# Patient Record
Sex: FEMALE | Race: BLACK OR AFRICAN AMERICAN | NOT HISPANIC OR LATINO | URBAN - METROPOLITAN AREA
[De-identification: names, ages, dates, MRNs, and addresses within clinical notes are randomized per-mention and may not be internally consistent; named-entity substitution may affect disease eponyms.]

---

## 2018-05-26 ENCOUNTER — EMERGENCY (EMERGENCY)
Facility: HOSPITAL | Age: 43
LOS: 1 days | Discharge: HOME | End: 2018-05-26
Attending: EMERGENCY MEDICINE | Admitting: EMERGENCY MEDICINE
Payer: SELF-PAY

## 2018-05-26 VITALS
RESPIRATION RATE: 17 BRPM | HEART RATE: 75 BPM | DIASTOLIC BLOOD PRESSURE: 84 MMHG | TEMPERATURE: 98 F | OXYGEN SATURATION: 100 % | SYSTOLIC BLOOD PRESSURE: 144 MMHG

## 2018-05-26 VITALS
SYSTOLIC BLOOD PRESSURE: 152 MMHG | HEART RATE: 95 BPM | OXYGEN SATURATION: 98 % | TEMPERATURE: 99 F | DIASTOLIC BLOOD PRESSURE: 94 MMHG | RESPIRATION RATE: 16 BRPM

## 2018-05-26 PROCEDURE — 70486 CT MAXILLOFACIAL W/O DYE: CPT | Mod: 26

## 2018-05-26 PROCEDURE — 99284 EMERGENCY DEPT VISIT MOD MDM: CPT

## 2018-05-26 PROCEDURE — 73030 X-RAY EXAM OF SHOULDER: CPT | Mod: 26,RT

## 2018-05-26 PROCEDURE — 70450 CT HEAD/BRAIN W/O DYE: CPT | Mod: 26

## 2018-05-26 RX ORDER — ACETAMINOPHEN 500 MG
650 TABLET ORAL ONCE
Qty: 0 | Refills: 0 | Status: COMPLETED | OUTPATIENT
Start: 2018-05-26 | End: 2018-05-26

## 2018-05-26 RX ADMIN — Medication 650 MILLIGRAM(S): at 14:21

## 2018-05-26 NOTE — ED PROVIDER NOTE - ATTENDING CONTRIBUTION TO CARE
NEW EASON MD: Reviewed and agree with the HPI as documented below:   43f c/o head and facial injury after large cabinet fell onto her head while she was reaching for something on a high shelf. No LOC, not on a/c. Felt dizzy immediately after, also w blurry vision in right eye, b/l nosebleed that has decreased since arrival. No vomiting, mild headache. Also c/o r shoulder pain and LLE pain from trauma. Has been ambulating w/o difficulty. Has not taken anything for pain.  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 Requests only tylenol for pain.    NEW AVILA, ATTENDING NOTE:      GEN: Patient is awake and alert and in no acute distress.    HEENT: 4 cm hematoma to right forehead with periorbital ecchymosis.  Airway patent.  No oropharyngeal edema.  No stridor.  Auricles are normal.  Neck supple.  EOMI.  Nasal bone tenderness to palpation with dries blood in nares without evidence of septal hematoma.  RESP: Lungs CTAB, no wheeze, no rhonchi,  no rales.    CV: Heart is regular rate and rhythm.    ABD: Abdomen is soft, not distended +BS.  Non-tender to palpation.  No rebound/no guarding.  MSK: Back is nontender, no CVAT.  Moving all 4 extremities.     NEURO: Neurologically grossly intact.     PSYCH: Psychiatrically normal mood and affect.  No apparent risk to self or others.     DR. AVILA, ATTENDING MD:    I performed a face to face bedside interview with patient regarding history of present illness, review of symptoms and past medical history. I completed an independent physical exam.  I have discussed patient's plan of care with the team of health care providers.   I agree with note as stated above, having amended the EMR as needed to reflect my findings. I have discussed the assessment and plan of care.  This includes during the time I functioned as the attending physician for this patient.

## 2018-05-26 NOTE — ED ADULT TRIAGE NOTE - CHIEF COMPLAINT QUOTE
Pt. hit in face this morning by cabinet that fell off wall. Laceration noted to nose, swelling to right orbit and bruise to forehead. c/o blurry vision of right eye and pain to nose. No active bleeding at this time. Denies blood thinner use.

## 2018-05-26 NOTE — CONSULT NOTE ADULT - SUBJECTIVE AND OBJECTIVE BOX
· HPI Objective Statement:  43f c/o head and facial injury after large cabinet fell onto her head while she was reaching for something on a high shelf. No LOC, not on a/c. Felt dizzy immediately after, also w blurry vision in right eye, b/l nosebleed that has resolved since arrival. No vomiting, mild headache. denies sob, voice changes, drooling, choking/gasping with po. currently denies vision changes, hl, vertigo, otorrhea, rhinorrhea. 	       HIV Status:  · Offered: Declined	    :    Past Medical History:  No pertinent past medical history.     Tobacco Usage:  · Tobacco Usage	Never smoker	      Allergies:        Allergies:  	No Known Allergies:     Home Medications:   * Outpatient Medication Status not yet specified       Review of Systems:  · CONSTITUTIONAL: no fever and no chills.	  · EYES: - - -	  · Eyes [+]: VISUAL CHANGES	  · Eyes [-]: no diplopia	  · ENMT: - - -	  · Ears [-]: no tinnitus	  · Nose [+]: EPISTAXIS, SINUS PRESSURE	  · Mouth/Throat [-]: no difficulty in swallowing	  · CARDIOVASCULAR: no chest pain and no edema.	  · RESPIRATORY: no chest pain, no cough, and no shortness of breath.	  · GASTROINTESTINAL: no abdominal pain, no bloating, no constipation, no diarrhea, no nausea and no vomiting.	  · MUSCULOSKELETAL: - - -	  · Musculoskeletal [+]: JOINT PAIN, r shoulder, l leg pain	  · Musculoskeletal [-]: no back pain, no neck pain	  · SKIN: - - -	  · Skin [+]: ABRASION, BRUISING, LACERATION	  · Skin [-]: no rash	  · NEUROLOGICAL: - - -	  · Neurological [+]: HEADACHE	  · Neurological [-]: no difficulty walking/imbalance	  · ROS STATEMENT: all other ROS negative except as per HPI	    VITAL SIGNS( Pullset):    ,,ED ADULT Flow Sheet:    26-May-2018 10:47	  · Temp (F): 99.1	  · Temp (C) Temp (C): 37.3	  · Temp site Temp Site: oral	  · Heart Rate Heart Rate (beats/min): 95	  · BP Systolic Systolic: 152	  · BP Diastolic Diastolic (mm Hg): 94	  · Respiration Rate (breaths/min) Respiration Rate (breaths/min): 16	  · SpO2 (%) SpO2 (%): 98	  · O2 delivery Patient On: room air	  · Presence of Pain: complains of pain/discomfort	  · Pain Rating (0-10): Rest: 7	  · Pain Rating (0-10): Activity: 7	  · SpO2 (%) SpO2 (%): 98	  · O2 delivery Patient On: room air	  · Preferred Language to Address Healthcare Preferred Language to Address Healthcare: English	    PHYSICAL EXAM:   NAD  breathing comfortably on ra  voice wnl  face: dorsal edema, no step-offs, non-mobile, no depression, right tear trough with sub-cm superficial abrasion  OD: infraorbital edema and ecchymosis, soft. eomi, perrl  OS: eomi, perrl  NC: septal deviation to the left, no septal hematoma, no active bleeding  oc/op: posterior op without bloodstaining or active bleeding  neck soft, flat, no crepitus      CT face/head:  Head CT: No intracranial hemorrhage, mass effect or displaced calvarial   fracture.     Maxillofacial CT: Bony nasal septal fracture and bilateral slightly   depressed nasal bone fractures with resultant moderate nasal vestibule   stenosis. Correlation with direct visualization is recommended to exclude   the possibility of a septal hematoma. Mild right periorbital swelling   without maxillofacial bone fractures.

## 2018-05-26 NOTE — ED PROVIDER NOTE - PROGRESS NOTE DETAILS
Elizabeth Goldberger PGY-1: seen by ENT for septal fx. Team in agreement that no evidence of septal hematoma, no acute surg intervention. Elizabeth Goldberger PGY-1: seen by ENT for septal fx. Team in agreement that no evidence of septal hematoma, no acute surg intervention. Pt feeling better. Will apply bacitracin to affected abrasion and dc w ent f/u

## 2018-05-26 NOTE — CONSULT NOTE ADULT - ASSESSMENT
43F w/ minimal depressed nasal fracture  -no acute intervention  -bacitracin over right tear trough abrasion after it is cleaned   -motrin/tylenol for pain prn  -ice packs to nose/right eye  - outpt [id number]  -avoid contact sports x 8 weeks

## 2018-05-26 NOTE — ED PROVIDER NOTE - SKIN LATERALITY #1
laceration just right of nasal bridge on right w scant active bleeding; also 0.5cm round lac on glabella/right right/abrasion just right of nasal bridge on right w scant active bleeding; also 0.5cm round area dried blood on middle of nasal bridge

## 2018-05-26 NOTE — ED PROVIDER NOTE - PLAN OF CARE
You were seen in the emergency department for facial trauma. You had a CT scan that showed nasal bone fractures. Please follow up with an ENT (contact list provided) in the next 5-7 days. Take motrin and tylenol as needed for pain. Return to the emergency department immediately if you experience visual changes, increased nasal bleeding, or any other concerning symptoms. You were seen in the emergency department for facial trauma. You had a CT scan that showed nasal bone fractures. Please follow up with an ENT (contact list provided) in the next 5-7 days. Take motrin and tylenol as needed for pain. Apply ice to the affected area for 20 minutes at a time. Return to the emergency department immediately if you experience visual changes, increased nasal bleeding, fever, or any other concerning symptoms.

## 2018-05-26 NOTE — ED PROVIDER NOTE - CARE PLAN
Principal Discharge DX:	Nasal septum fracture  Assessment and plan of treatment:	You were seen in the emergency department for facial trauma. You had a CT scan that showed nasal bone fractures. Please follow up with an ENT (contact list provided) in the next 5-7 days. Take motrin and tylenol as needed for pain. Return to the emergency department immediately if you experience visual changes, increased nasal bleeding, or any other concerning symptoms. Principal Discharge DX:	Nasal septum fracture  Assessment and plan of treatment:	You were seen in the emergency department for facial trauma. You had a CT scan that showed nasal bone fractures. Please follow up with an ENT (contact list provided) in the next 5-7 days. Take motrin and tylenol as needed for pain. Apply ice to the affected area for 20 minutes at a time. Return to the emergency department immediately if you experience visual changes, increased nasal bleeding, fever, or any other concerning symptoms.

## 2018-05-26 NOTE — ED PROVIDER NOTE - OBJECTIVE STATEMENT
43f c/o head and facial injury after large cabinet fell onto her head while she was reaching for something on a high shelf. No LOC, not on a/c. Felt dizzy immediately after, also w blurry vision in right eye, b/l nosebleed that has decreased since arrival. No vomiting, mild headache. Also c/o r shoulder pain and LLE pain from trauma. Has been ambulating w/o difficulty.Has not taken anything for pain.  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 Requests only tylenol for pain ctrl. 43f c/o head and facial injury after large cabinet fell onto her head while she was reaching for something on a high shelf. No LOC, not on a/c. Felt dizzy immediately after, also w blurry vision in right eye, b/l nosebleed that has decreased since arrival. No vomiting, mild headache. Also c/o r shoulder pain and LLE pain from trauma. Has been ambulating w/o difficulty. Has not taken anything for pain.  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 Requests only tylenol for pain ctrl.

## 2018-05-26 NOTE — ED PROVIDER NOTE - MEDICAL DECISION MAKING DETAILS
43f w head/facial injuries including periorbital swelling and ecchymosis, b/l epistaxis not actively bleeding, and facial lacerations. Neuro intact. Will ct head and max/fac to assess for fx, xray shoulder, pain ctrl, lac repair, reassess 43f w head/facial injuries including periorbital swelling and ecchymosis, b/l epistaxis not actively bleeding, and facial lacerations. Neuro intact. Will ct head and max/fac to assess for fx, xray shoulder, pain ctrl, reassess

## 2018-05-29 PROBLEM — Z00.00 ENCOUNTER FOR PREVENTIVE HEALTH EXAMINATION: Status: ACTIVE | Noted: 2018-05-29

## 2018-06-22 ENCOUNTER — APPOINTMENT (OUTPATIENT)
Dept: OTOLARYNGOLOGY | Facility: CLINIC | Age: 43
End: 2018-06-22
Payer: COMMERCIAL

## 2018-06-22 VITALS
HEIGHT: 63 IN | WEIGHT: 197 LBS | HEART RATE: 59 BPM | OXYGEN SATURATION: 98 % | DIASTOLIC BLOOD PRESSURE: 80 MMHG | BODY MASS INDEX: 34.91 KG/M2 | SYSTOLIC BLOOD PRESSURE: 122 MMHG

## 2018-06-22 DIAGNOSIS — S02.2XXA FRACTURE OF NASAL BONES, INITIAL ENCOUNTER FOR CLOSED FRACTURE: ICD-10-CM

## 2018-06-22 DIAGNOSIS — Z83.3 FAMILY HISTORY OF DIABETES MELLITUS: ICD-10-CM

## 2018-06-22 DIAGNOSIS — Z87.09 PERSONAL HISTORY OF OTHER DISEASES OF THE RESPIRATORY SYSTEM: ICD-10-CM

## 2018-06-22 DIAGNOSIS — Z87.81 PERSONAL HISTORY OF (HEALED) TRAUMATIC FRACTURE: ICD-10-CM

## 2018-06-22 DIAGNOSIS — Z83.511 FAMILY HISTORY OF GLAUCOMA: ICD-10-CM

## 2018-06-22 DIAGNOSIS — L91.0 HYPERTROPHIC SCAR: ICD-10-CM

## 2018-06-22 DIAGNOSIS — J30.9 ALLERGIC RHINITIS, UNSPECIFIED: ICD-10-CM

## 2018-06-22 DIAGNOSIS — Z84.89 FAMILY HISTORY OF OTHER SPECIFIED CONDITIONS: ICD-10-CM

## 2018-06-22 DIAGNOSIS — R41.3 OTHER AMNESIA: ICD-10-CM

## 2018-06-22 DIAGNOSIS — Z82.49 FAMILY HISTORY OF ISCHEMIC HEART DISEASE AND OTHER DISEASES OF THE CIRCULATORY SYSTEM: ICD-10-CM

## 2018-06-22 PROCEDURE — 99205 OFFICE O/P NEW HI 60 MIN: CPT

## 2018-06-23 ENCOUNTER — EMERGENCY (EMERGENCY)
Facility: HOSPITAL | Age: 43
LOS: 1 days | Discharge: HOME | End: 2018-06-23
Attending: EMERGENCY MEDICINE | Admitting: EMERGENCY MEDICINE
Payer: COMMERCIAL

## 2018-06-23 VITALS
SYSTOLIC BLOOD PRESSURE: 148 MMHG | RESPIRATION RATE: 16 BRPM | HEART RATE: 72 BPM | TEMPERATURE: 98 F | OXYGEN SATURATION: 100 % | DIASTOLIC BLOOD PRESSURE: 83 MMHG

## 2018-06-23 LAB
ALBUMIN SERPL ELPH-MCNC: 4.3 G/DL — SIGNIFICANT CHANGE UP (ref 3.3–5)
ALP SERPL-CCNC: 82 U/L — SIGNIFICANT CHANGE UP (ref 40–120)
ALT FLD-CCNC: 21 U/L — SIGNIFICANT CHANGE UP (ref 4–33)
AST SERPL-CCNC: 19 U/L — SIGNIFICANT CHANGE UP (ref 4–32)
BASOPHILS # BLD AUTO: 0.06 K/UL — SIGNIFICANT CHANGE UP (ref 0–0.2)
BASOPHILS NFR BLD AUTO: 0.7 % — SIGNIFICANT CHANGE UP (ref 0–2)
BILIRUB SERPL-MCNC: < 0.2 MG/DL — LOW (ref 0.2–1.2)
BUN SERPL-MCNC: 13 MG/DL — SIGNIFICANT CHANGE UP (ref 7–23)
CALCIUM SERPL-MCNC: 9.5 MG/DL — SIGNIFICANT CHANGE UP (ref 8.4–10.5)
CHLORIDE SERPL-SCNC: 101 MMOL/L — SIGNIFICANT CHANGE UP (ref 98–107)
CO2 SERPL-SCNC: 28 MMOL/L — SIGNIFICANT CHANGE UP (ref 22–31)
CREAT SERPL-MCNC: 0.7 MG/DL — SIGNIFICANT CHANGE UP (ref 0.5–1.3)
EOSINOPHIL # BLD AUTO: 0.28 K/UL — SIGNIFICANT CHANGE UP (ref 0–0.5)
EOSINOPHIL NFR BLD AUTO: 3.2 % — SIGNIFICANT CHANGE UP (ref 0–6)
GLUCOSE SERPL-MCNC: 90 MG/DL — SIGNIFICANT CHANGE UP (ref 70–99)
HCT VFR BLD CALC: 39.3 % — SIGNIFICANT CHANGE UP (ref 34.5–45)
HGB BLD-MCNC: 13.5 G/DL — SIGNIFICANT CHANGE UP (ref 11.5–15.5)
IMM GRANULOCYTES # BLD AUTO: 0.03 # — SIGNIFICANT CHANGE UP
IMM GRANULOCYTES NFR BLD AUTO: 0.3 % — SIGNIFICANT CHANGE UP (ref 0–1.5)
LYMPHOCYTES # BLD AUTO: 2.85 K/UL — SIGNIFICANT CHANGE UP (ref 1–3.3)
LYMPHOCYTES # BLD AUTO: 32.9 % — SIGNIFICANT CHANGE UP (ref 13–44)
MCHC RBC-ENTMCNC: 28.8 PG — SIGNIFICANT CHANGE UP (ref 27–34)
MCHC RBC-ENTMCNC: 34.4 % — SIGNIFICANT CHANGE UP (ref 32–36)
MCV RBC AUTO: 84 FL — SIGNIFICANT CHANGE UP (ref 80–100)
MONOCYTES # BLD AUTO: 0.64 K/UL — SIGNIFICANT CHANGE UP (ref 0–0.9)
MONOCYTES NFR BLD AUTO: 7.4 % — SIGNIFICANT CHANGE UP (ref 2–14)
NEUTROPHILS # BLD AUTO: 4.81 K/UL — SIGNIFICANT CHANGE UP (ref 1.8–7.4)
NEUTROPHILS NFR BLD AUTO: 55.5 % — SIGNIFICANT CHANGE UP (ref 43–77)
NRBC # FLD: 0 — SIGNIFICANT CHANGE UP
PLATELET # BLD AUTO: 332 K/UL — SIGNIFICANT CHANGE UP (ref 150–400)
PMV BLD: 9.4 FL — SIGNIFICANT CHANGE UP (ref 7–13)
POTASSIUM SERPL-MCNC: 4.1 MMOL/L — SIGNIFICANT CHANGE UP (ref 3.5–5.3)
POTASSIUM SERPL-SCNC: 4.1 MMOL/L — SIGNIFICANT CHANGE UP (ref 3.5–5.3)
PROT SERPL-MCNC: 8 G/DL — SIGNIFICANT CHANGE UP (ref 6–8.3)
RBC # BLD: 4.68 M/UL — SIGNIFICANT CHANGE UP (ref 3.8–5.2)
RBC # FLD: 13.7 % — SIGNIFICANT CHANGE UP (ref 10.3–14.5)
SODIUM SERPL-SCNC: 138 MMOL/L — SIGNIFICANT CHANGE UP (ref 135–145)
WBC # BLD: 8.67 K/UL — SIGNIFICANT CHANGE UP (ref 3.8–10.5)
WBC # FLD AUTO: 8.67 K/UL — SIGNIFICANT CHANGE UP (ref 3.8–10.5)

## 2018-06-23 PROCEDURE — 70496 CT ANGIOGRAPHY HEAD: CPT | Mod: 26

## 2018-06-23 PROCEDURE — 70498 CT ANGIOGRAPHY NECK: CPT | Mod: 26

## 2018-06-23 PROCEDURE — 99285 EMERGENCY DEPT VISIT HI MDM: CPT

## 2018-06-23 RX ORDER — ACETAMINOPHEN 500 MG
975 TABLET ORAL ONCE
Qty: 0 | Refills: 0 | Status: COMPLETED | OUTPATIENT
Start: 2018-06-23 | End: 2018-06-23

## 2018-06-23 RX ORDER — SODIUM CHLORIDE 9 MG/ML
1000 INJECTION INTRAMUSCULAR; INTRAVENOUS; SUBCUTANEOUS ONCE
Qty: 0 | Refills: 0 | Status: COMPLETED | OUTPATIENT
Start: 2018-06-23 | End: 2018-06-23

## 2018-06-23 RX ORDER — METOCLOPRAMIDE HCL 10 MG
10 TABLET ORAL ONCE
Qty: 0 | Refills: 0 | Status: COMPLETED | OUTPATIENT
Start: 2018-06-23 | End: 2018-06-23

## 2018-06-23 RX ADMIN — Medication 975 MILLIGRAM(S): at 18:57

## 2018-06-23 RX ADMIN — Medication 10 MILLIGRAM(S): at 18:57

## 2018-06-23 RX ADMIN — SODIUM CHLORIDE 1000 MILLILITER(S): 9 INJECTION INTRAMUSCULAR; INTRAVENOUS; SUBCUTANEOUS at 18:57

## 2018-06-23 NOTE — ED PROVIDER NOTE - ATTENDING CONTRIBUTION TO CARE
ED Attending Dr. Blount: 44 yo female with asthma, PCOS, recent ED visit for head injury and nasal fracture s/p cabinets falling on head, in ED with worsening of headache that she has had for past month since incident.  For 3 days now pt has had new onset occipital headache that was preceded by a "popping" feeling in back of head.  No CP/SOB, N/V/D or abdominal pain.  Also incidentally pt noting vaginal spotting but no abdominal pain.  On exam pt well appearing, in NAD, heart RRR, lungs CTAB, abd NTND, extremities without swelling, strength 5/5 in all extremities and skin without rash.  Gait normal.

## 2018-06-23 NOTE — ED PROVIDER NOTE - PROGRESS NOTE DETAILS
KEYON Mejia- KEYON Mejia- Explained to patient ED team would like to perform LP to r/o subarachnoid hemorrhage. which if identified early would lead to appropriate intervention in a timely manner lessing the burden of disability and death Risks of leaving before this had been completed include: misdiagnosis, worsening illness leading up to and including prolonged or permanent disability or death. Despite this they stated they wanted to leave due to feeling much better and refused further evaluation. they appear to be mentating appropriately, free from distracting injury, appear to have intact insight, judgement, and reason and in my opinion have the capacity to make this decision. They understand that they may return to seek medical attention here at whatever time they want. I highly advised them to return to the ED immediately if they experienced any worsening headache, neck stiffness, slurred speech, weakness, numbness, vomiting, photophobia reconsidered treatment a/o admission, or had any other concerns. I recommended they follow-up with Neurology within 24 hours for further evaluation and treatment. KEYON Mejia- Explained to patient ED team would like to perform LP to r/o subarachnoid hemorrhage. which if identified early would lead to appropriate intervention in a timely manner lessing the burden of disability and death Risks of leaving before this had been completed include: misdiagnosis, worsening illness leading up to and including prolonged or permanent disability or death. Despite this they stated they wanted to leave due to feeling much better and refused further evaluation. they appear to be mentating appropriately, free from distracting injury, appear to have intact insight, judgement, and reason and in my opinion have the capacity to make this decision. They understand that they may return to seek medical attention here at whatever time they want. I highly advised them to return to the ED immediately if they experienced any worsening headache, neck stiffness, slurred speech, weakness, numbness, vomiting, photophobia reconsidered treatment a/o admission, or had any other concerns. I recommended they follow-up with Neurology within 24 hours for further evaluation and treatment. pt states headache is now 1/10 and significantly improved.

## 2018-06-23 NOTE — ED ADULT NURSE NOTE - CHIEF COMPLAINT QUOTE
pressure to head radiating back of neck,upper back area since wed.  denies n,v, visual disturbance.  saw md yesterday,told bp  elevated. "told needed mri"  states head injury 5/26,cabinets fell on head,seen [facility] states ct  completed.   reports difficulty concentrating x past wk. reports vag sp otting x 8 days.  lmp 4/15

## 2018-06-23 NOTE — CONSULT NOTE ADULT - SUBJECTIVE AND OBJECTIVE BOX
Neurology Consult Note    HPI "Pt is a 42 y/o female with pmhx of asthma, PCOS, presents to ED for headache and irregular vaginal spotting. Pt seen at [facility] ED 5/26 s/p 6 cabinets falling on to her head (no loc). Found with nasal septal fracture, ct head negative. States for past month has had baseline frontal headaches with her nasal fracture, been taking Ibuprofen for pain. Pt c/o new acute onset occipital headache x 3 days. Constant, with radiation to her neck and upper back. Did not take any medication for pain. States she fell a "fullness in the back of my head, like something popped" while sitting at home, was 6/10 on onset. Headache 4/10 today changed in quality to a dull ache, was told if her headache changes in nature to come to ED. Denies worst headache of her life. Pt admitting to amnesia and difficulty concentrating prior to new onset of headache x 1 week. States she had difficulty spelling her doctor's name. Pt states she was sitting at a green light with her car for a few seconds and took her awhile to realize to press the gas to go. Pt seen by pcp and sent for outpatient MRI not completed yet. LMP 4/15/18, not sexually active. No hx of irregular periods for last 6 years. Intermittent vaginal spotting x 1 week. States bleeds for 1-2 days then goes away for 1 day and returns. Denies any fever, chills, travel, rhinorrhea, cp, sob, abd pain, n/v, photophobia, vision changes, slurred speech, weakness, numbness, dizziness, LOC, ataxia, family hx of aneurysm, dysuria, polyuria, vaginal discharge"    Neurology consulted for occipital headache. Pt reports headache began 3 days ago, she felt a popping sensation. Headache was moderate 6-7/10 pain, associated with photophobia. She is also reported forgetfulness and difficulty concentrating since her head injury and has gotten mildly worse over the past week. At current pt reports headache and neck is 1/10 and is feeling almost normal after receiving medication in the ED.     Allergies    No Known Allergies    Intolerances    PAST MEDICAL & SURGICAL HISTORY:  PCOS (polycystic ovarian syndrome)  Asthma  No significant past surgical history    Vital Signs Last 24 Hrs  T(C): 36.9 (23 Jun 2018 17:17), Max: 36.9 (23 Jun 2018 17:17)  T(F): 98.4 (23 Jun 2018 17:17), Max: 98.4 (23 Jun 2018 17:17)  HR: 72 (23 Jun 2018 17:17) (72 - 72)  BP: 148/83 (23 Jun 2018 17:17) (148/83 - 148/83)  BP(mean): --  RR: 16 (23 Jun 2018 17:17) (16 - 16)  SpO2: 100% (23 Jun 2018 17:17) (100% - 100%)    Neurological Exam:  Mental Status: Orientated to self, date and place.  Attention intact.  No aphasia or neglect.  Knowledge intact.  Registration intact.   Cranial Nerves: PERRL, EOMI, VFF, no nystagmus.  CN V1-3 intact to light touch. No facial asymmetry.  Hearing intact to finger rub bilaterally.  Tongue midline.  Sternocleidomastoid and Trapezius intact bilaterally.    Motor:   Tone: normal.                  Strength:     [] Upper extremity                      Delt       Bicep    Tricep                                                  R         5/5        5/5        5/5       5/5                                               L          5/5        5/5        5/5       5/5  [] Lower extremity                       HF          KE          KF        DF         PF                                               R        5/5        5/5        5/5       5/5       5/5                                               L         5/5        5/5       5/5       5/5        5/5  Pronator drift: none                 Dysmetria: None to finger-nose-finger b/l  Sensation: intact to light touch throughout  Deep Tendon Reflexes: 1+ bilateral biceps, triceps, brachioradialis, 2+ knee and ankle  Toes flexor bilaterally  Gait: normal and stable    Labs                        13.5   8.67  )-----------( 332      ( 23 Jun 2018 19:01 )             39.3   06-23    138  |  101  |  13  ----------------------------<  90  4.1   |  28  |  0.70    Ca    9.5      23 Jun 2018 19:00    TPro  8.0  /  Alb  4.3  /  TBili  < 0.2<L>  /  DBili  x   /  AST  19  /  ALT  21  /  AlkPhos  82  06-23    LIVER FUNCTIONS - ( 23 Jun 2018 19:00 )  Alb: 4.3 g/dL / Pro: 8.0 g/dL / ALK PHOS: 82 u/L / ALT: 21 u/L / AST: 19 u/L / GGT: x

## 2018-06-23 NOTE — ED ADULT TRIAGE NOTE - CHIEF COMPLAINT QUOTE
pressure to head radiating back of neck,upper back area since wed.  denies n,v, visual disturbance.  saw md yesterday,told bp  elevated. "told needed mri"  states head injury 5/26,cabinets fell on head,seen [facility] states ct  completed.   reports difficulty concentrating x past wk. reports vag sp otting x 8 days.  lmp 4/15

## 2018-06-23 NOTE — ED PROVIDER NOTE - CARE PLAN
Principal Discharge DX:	Headache  Assessment and plan of treatment:	Follow up with your primary care within 48 hours. Follow up with Neurology within 48 hours, referral list given. Obtain outpatient MRI. Return to ER for any new or worsening symptoms, fever, vomiting, weakness, numbness, slurred speech, or any other concerns. Principal Discharge DX:	Headache  Assessment and plan of treatment:	1)Follow up with your primary care within 48 hours. Follow up with Neurology within 48 hours, referral list given. Obtain outpatient MRI. 2) Follow up with your OBGYN this week. Return to ER for any new or worsening symptoms, fever, vomiting, weakness, numbness, slurred speech, or any other concerns.

## 2018-06-23 NOTE — ED PROVIDER NOTE - OBJECTIVE STATEMENT
44 y/o female with pmhx of asthma, PCOS, presents to ED for headache and irregular vaginal spotting. Pt seen at [facility] ED 5/26 s/p 6 cabinets falling on to her head (no loc). Found with nasal septal fracture, ct head negative. States for past month has had baseline frontal headaches with her nasal fracture, been taking Ibuprofen for pain. Pt c/o new acute onset occipital headache x 3 days. Constant, with radiation to her neck and upper back. Did not take any medication for pain. States she fell a "fullness in the back of my head, like something popped" while sitting at home, was 6/10 on onset. Headache 4/10 today changed in quality to a dull ache, was told if her headache changes in nature to come to ED. Denies worst headache of her life. Pt admitting to amnesia and difficulty concentrating prior to new onset of headache x 1 week. States she had difficulty spelling her doctor's name. Pt states she was sitting at a green light with her car for a few seconds and took her awhile to realize to press the gas to go. LMP 4/15/18, not sexually active. No hx of irregular periods for last 6 years. Intermittent vaginal spotting x 1 week. States bleeds for 1-2 days then goes away for 1 day and returns. Denies any fever, chills, travel, rhinorrhea, cp, sob, abd pain, n/v, photophobia, vision changes, slurred speech, weakness, numbness, dizziness, LOC, ataxia, family hx of aneurysm, dysuria, polyuria, vaginal discharge. 42 y/o female with pmhx of asthma, PCOS, presents to ED for headache and irregular vaginal spotting. Pt seen at [facility] ED 5/26 s/p 6 cabinets falling on to her head (no loc). Found with nasal septal fracture, ct head negative. States for past month has had baseline frontal headaches with her nasal fracture, been taking Ibuprofen for pain. Pt c/o new acute onset occipital headache x 3 days. Constant, with radiation to her neck and upper back. Did not take any medication for pain. States she fell a "fullness in the back of my head, like something popped" while sitting at home, was 6/10 on onset. Headache 4/10 today changed in quality to a dull ache, was told if her headache changes in nature to come to ED. Denies worst headache of her life. Pt admitting to amnesia and difficulty concentrating prior to new onset of headache x 1 week. States she had difficulty spelling her doctor's name. Pt states she was sitting at a green light with her car for a few seconds and took her awhile to realize to press the gas to go. Pt seen by pcp and sent for outpatient MRI not completed yet. LMP 4/15/18, not sexually active. No hx of irregular periods for last 6 years. Intermittent vaginal spotting x 1 week. States bleeds for 1-2 days then goes away for 1 day and returns. Denies any fever, chills, travel, rhinorrhea, cp, sob, abd pain, n/v, photophobia, vision changes, slurred speech, weakness, numbness, dizziness, LOC, ataxia, family hx of aneurysm, dysuria, polyuria, vaginal discharge. 42 y/o female with pmhx of asthma, PCOS, presents to ED for headache and irregular vaginal spotting. Pt seen at [facility] ED 5/26 s/p 6 cabinets falling on to her head (no loc). Found with nasal septal fracture, ct head negative. States for past month has had baseline frontal headaches with her nasal fracture, been taking Ibuprofen for pain. Pt c/o new acute onset occipital headache x 3 days. Constant, with radiation to her neck and upper back. Did not take any medication for pain. States she fell a "fullness in the back of my head, like something popped" while sitting at home, was 6/10 on onset. Headache 4/10 today changed in quality to a dull ache, was told if her headache changes in nature to come to ED. Denies worst headache of her life. Pt admitting to amnesia and difficulty concentrating prior to new onset of headache x 1 week. States she had difficulty spelling her doctor's name. Pt states she was sitting at a green light with her car for a few seconds and took her awhile to realize to press the gas to go. Pt seen by pcp and sent for outpatient MRI not completed yet. LMP 4/15/18, not sexually active. No hx of irregular periods for last 6 years. Intermittent vaginal spotting x 1 week. States bleeds for 1-2 days then goes away for 1 day and returns. Denies any fever, chills, travel, rhinorrhea, cp, sob, abd pain, n/v, photophobia, vision changes, slurred speech, weakness, numbness, dizziness, LOC, ataxia, family hx of aneurysm, dysuria, polyuria, vaginal discharge. No ocps, iuds or hx of dvt.

## 2018-06-23 NOTE — ED PROVIDER NOTE - MEDICAL DECISION MAKING DETAILS
44 y/o female with pmhx of asthma, PCOS, presents to ED for headache and irregular vaginal spotting. 42 y/o female with pmhx of asthma, PCOS, presents to ED for headache and irregular vaginal spotting. 1) likely post-concussive syndrome. will consult neurology r/o possible subarachnoid hemorrhage, LP. 2) abnormal vaginal spotting - follow up with OBGYN outpt, labs, H/H. ucg negative.

## 2018-06-23 NOTE — CONSULT NOTE ADULT - ASSESSMENT
Neurology consulted for occipital headache. Pt reports headache began 3 days ago, she felt a popping sensation. Headache was moderate 6-7/10 pain, associated with photophobia. She is also reported forgetfulness and difficulty concentrating since her head injury and has gotten mildly worse over the past week. At current pt reports headache and neck is 1/10 and is feeling almost normal after receiving medication in the ED. On exam pt has no focal deficits. Ambulating at her baseline. CT head imaging to be done.     Impression - Post concussive syndrome 2.2 head injury one month ago. Rule out ICH    Reccs:  Pt warrants an CT head noncon to rule out bleeding due to change in nature of headache - most sensitive to rule out ICH  She can follow up with the Neurology clinic as an outpatient for Post concussion management and monitoring  She should obtain MRI brain as an outpatient as was originally planned and arranged by her PMD  If CT head noncon is unremarkable, no further inpatient neurological workup is warranted. Neurology consulted for occipital headache. Pt reports headache began 3 days ago, she felt a popping sensation. Headache was moderate 6-7/10 pain, associated with photophobia. She is also reporting forgetfulness and difficulty concentrating since her head injury and has gotten mildly worse over the past week. At current pt reports headache and neck pain are both 1/10 and is feeling almost normal after receiving medication in the ED. On exam pt has no focal deficits. No nystagmus, pupils are equal, no neck stiffness/meningeal signs. Ambulating at her baseline. CT head imaging to be done.     Impression - Post-concussive syndrome 2/2 head injury one month ago. Rule out ICH    Reccs:  Pt warrants a CT head noncon to rule out bleeding due to change in nature of headache - most sensitive scan to rule out ICH  She can follow up with the Neurology clinic as an outpatient for post-concussion management and monitoring.  She should obtain MRI brain as an outpatient as originally planned and arranged by her PMD.  If CT head noncon is unremarkable, no further inpatient neurological workup is warranted. Pt is a 44 y/o female with pmhx of asthma, PCOS, presents to ED for headache and irregular vaginal spotting. Neurology consulted for occipital headache. Pt reports headache began 3 days ago, she felt a popping sensation. Headache was moderate 6-7/10 pain, associated with photophobia. She is also reporting forgetfulness and difficulty concentrating since her head injury and has gotten mildly worse over the past week. At current pt reports headache and neck pain are both 1/10 and is feeling almost normal after receiving medication in the ED. On exam pt has no focal deficits. No nystagmus, pupils are equal, no neck stiffness/meningeal signs. Ambulating at her baseline. CT head imaging to be done.     Impression - Post-concussive syndrome 2/2 head injury one month ago. Rule out ICH    Reccs:  Pt warrants a CT head noncon to rule out bleeding due to change in nature of headache - most sensitive scan to rule out ICH  She can follow up with the Neurology clinic as an outpatient for post-concussion management and monitoring.  She should obtain MRI brain as an outpatient as originally planned and arranged by her PMD.  If CT head noncon is unremarkable, no further inpatient neurological workup is warranted.

## 2018-06-23 NOTE — ED ADULT NURSE NOTE - OBJECTIVE STATEMENT
Pt. A&Ox4, presents to ER c/o occipital headache with pressure x 4 days. She states she was sitting at her computer on Wednesday when she felt a "pop" in the back of her head. Pt. hit her head on a cabinet 5/26. Has been c/o facial pain but has since subsided. Denies dizziness, blurry vision, n/v. #20g IV placed in left AC, labs sent. MD at bedside for eval. VSS. Will continue to monitor.

## 2018-06-23 NOTE — ED PROVIDER NOTE - PLAN OF CARE
Follow up with your primary care within 48 hours. Follow up with Neurology within 48 hours, referral list given. Obtain outpatient MRI. Return to ER for any new or worsening symptoms, fever, vomiting, weakness, numbness, slurred speech, or any other concerns. 1)Follow up with your primary care within 48 hours. Follow up with Neurology within 48 hours, referral list given. Obtain outpatient MRI. 2) Follow up with your OBGYN this week. Return to ER for any new or worsening symptoms, fever, vomiting, weakness, numbness, slurred speech, or any other concerns.

## 2018-06-23 NOTE — ED PROVIDER NOTE - ENMT, MLM
Airway patent, Nasal mucosa clear. No rhinorrhea. Mouth with normal mucosa. Throat has no vesicles, no oropharyngeal exudates and uvula is midline. +healing scar over nasal bone. No swelling or ecchymosis.

## 2018-06-23 NOTE — ED PROVIDER NOTE - CRANIAL NERVE AND PUPILLARY EXAM
5/5 strength of ue and le b/l. normal finger to nose b/l./cranial nerves 2-12 intact/extra-ocular movements intact

## 2018-06-23 NOTE — ED PROVIDER NOTE - SHIFT CHANGE DETAILS
awaiting CT head and neurology recommendations; low suspicion for SAH, but would consider LP if imaging negative

## 2020-09-28 NOTE — ED PROVIDER NOTE - EYES [-], MLM
619  Subjective Finding:    Chief compalint: Patient presents with:  Back Pain  , Pain Scale: 5/10, Intensity: sharp, Duration:  5 days, Radiating: no.    Date of injury:     Activities that the pain restricts:   Home/household/hobbies/social activities: yes.  Work duties: yes.  Sleep: no.  Makes symptoms better: rest.  Makes symptoms worse: activity, cervical extension and cervical flexion.  Have you seen anyone else for the symptoms? No.  Work related: no.  Automobile related injury: no.    Objective and Assessment:    Posture Analysis:   High shoulder: .  Head tilt: .  High iliac crest: .  Head carriage: forward.  Thoracic Kyphosis: neutral.  Lumbar Lordosis: neutral.    Lumbar Range of Motion: .  Cervical Range of Motion: flexion decreased and extension decreased.  Thoracic Range of Motion: .  Extremity Range of Motion: .    Palpation:       Segmental dysfunction pre-treatment and treatment area:C567    T10   L23      Assessment post-treatment:  Cervical: ROM increased.  Thoracic: ROM increased.  Lumbar: .    Comments: .      Complicating Factors: .    Procedure(s):  CMT:  14434 Chiropractic manipulative treatment 1-2 regions performed   C and T spine    Modalities:  None performed this visit    Therapeutic procedures:  None    Plan:  Treatment plan: PRN.  Instructed patient: stretch as instructed at visit.  Short term goals: increase ROM.  Long term goals: increase ADL.  Prognosis: excellent.              no diplopia

## 2024-12-15 ENCOUNTER — NON-APPOINTMENT (OUTPATIENT)
Age: 49
End: 2024-12-15

## 2024-12-21 PROBLEM — E28.2 POLYCYSTIC OVARIAN SYNDROME: Chronic | Status: ACTIVE | Noted: 2018-06-23

## 2024-12-21 PROBLEM — J45.909 UNSPECIFIED ASTHMA, UNCOMPLICATED: Chronic | Status: ACTIVE | Noted: 2018-06-23

## 2024-12-23 ENCOUNTER — NON-APPOINTMENT (OUTPATIENT)
Age: 49
End: 2024-12-23

## 2024-12-23 ENCOUNTER — APPOINTMENT (OUTPATIENT)
Dept: OTOLARYNGOLOGY | Facility: CLINIC | Age: 49
End: 2024-12-23
Payer: COMMERCIAL

## 2024-12-23 DIAGNOSIS — J31.0 CHRONIC RHINITIS: ICD-10-CM

## 2024-12-23 DIAGNOSIS — J30.9 ALLERGIC RHINITIS, UNSPECIFIED: ICD-10-CM

## 2024-12-23 DIAGNOSIS — Z86.39 PERSONAL HISTORY OF OTHER ENDOCRINE, NUTRITIONAL AND METABOLIC DISEASE: ICD-10-CM

## 2024-12-23 DIAGNOSIS — Z87.09 PERSONAL HISTORY OF OTHER DISEASES OF THE RESPIRATORY SYSTEM: ICD-10-CM

## 2024-12-23 DIAGNOSIS — H65.92 UNSPECIFIED NONSUPPURATIVE OTITIS MEDIA, LEFT EAR: ICD-10-CM

## 2024-12-23 DIAGNOSIS — Z86.79 PERSONAL HISTORY OF OTHER DISEASES OF THE CIRCULATORY SYSTEM: ICD-10-CM

## 2024-12-23 PROCEDURE — 31231 NASAL ENDOSCOPY DX: CPT

## 2024-12-23 PROCEDURE — 92567 TYMPANOMETRY: CPT

## 2024-12-23 PROCEDURE — 99203 OFFICE O/P NEW LOW 30 MIN: CPT | Mod: 25

## 2024-12-23 PROCEDURE — 92557 COMPREHENSIVE HEARING TEST: CPT

## 2024-12-23 RX ORDER — AMOXICILLIN 875 MG/1
TABLET, FILM COATED ORAL
Refills: 0 | Status: ACTIVE | COMMUNITY

## 2024-12-23 RX ORDER — ROSUVASTATIN CALCIUM 5 MG/1
TABLET, FILM COATED ORAL
Refills: 0 | Status: ACTIVE | COMMUNITY

## 2024-12-23 RX ORDER — TOBRAMYCIN 900 MCG/MG
POWDER (GRAM) MISCELLANEOUS
Refills: 0 | Status: ACTIVE | COMMUNITY

## 2024-12-23 RX ORDER — OLMESARTAN MEDOXOMIL / AMLODIPINE BESYLATE / HYDROCHLOROTHIAZIDE 40; 10; 25 MG/1; MG/1; MG/1
TABLET, FILM COATED ORAL
Refills: 0 | Status: ACTIVE | COMMUNITY

## 2024-12-29 LAB — EAR NOSE AND THROAT CULTURE: NORMAL

## 2025-01-09 ENCOUNTER — APPOINTMENT (OUTPATIENT)
Dept: OTOLARYNGOLOGY | Facility: CLINIC | Age: 50
End: 2025-01-09
Payer: COMMERCIAL

## 2025-01-09 ENCOUNTER — NON-APPOINTMENT (OUTPATIENT)
Age: 50
End: 2025-01-09

## 2025-01-09 DIAGNOSIS — H93.293 OTHER ABNORMAL AUDITORY PERCEPTIONS, BILATERAL: ICD-10-CM

## 2025-01-09 DIAGNOSIS — J31.0 CHRONIC RHINITIS: ICD-10-CM

## 2025-01-09 DIAGNOSIS — J32.0 CHRONIC MAXILLARY SINUSITIS: ICD-10-CM

## 2025-01-09 DIAGNOSIS — R04.0 EPISTAXIS: ICD-10-CM

## 2025-01-09 DIAGNOSIS — J30.9 ALLERGIC RHINITIS, UNSPECIFIED: ICD-10-CM

## 2025-01-09 PROCEDURE — 99214 OFFICE O/P EST MOD 30 MIN: CPT | Mod: 25

## 2025-01-09 PROCEDURE — 92557 COMPREHENSIVE HEARING TEST: CPT

## 2025-01-09 PROCEDURE — 92567 TYMPANOMETRY: CPT

## 2025-01-09 PROCEDURE — 31231 NASAL ENDOSCOPY DX: CPT

## 2025-01-09 RX ORDER — AMOXICILLIN AND CLAVULANATE POTASSIUM 875; 125 MG/1; MG/1
875-125 TABLET, COATED ORAL TWICE DAILY
Qty: 20 | Refills: 2 | Status: ACTIVE | COMMUNITY
Start: 2025-01-09 | End: 1900-01-01

## 2025-01-13 PROBLEM — H93.293 ABNORMAL AUDITORY PERCEPTION OF BOTH EARS: Status: ACTIVE | Noted: 2025-01-13

## 2025-01-20 ENCOUNTER — APPOINTMENT (OUTPATIENT)
Dept: CT IMAGING | Facility: IMAGING CENTER | Age: 50
End: 2025-01-20
Payer: COMMERCIAL

## 2025-01-20 ENCOUNTER — OUTPATIENT (OUTPATIENT)
Dept: OUTPATIENT SERVICES | Facility: HOSPITAL | Age: 50
LOS: 1 days | End: 2025-01-20
Payer: COMMERCIAL

## 2025-01-20 DIAGNOSIS — R04.0 EPISTAXIS: ICD-10-CM

## 2025-01-20 DIAGNOSIS — J31.0 CHRONIC RHINITIS: ICD-10-CM

## 2025-01-20 DIAGNOSIS — J32.0 CHRONIC MAXILLARY SINUSITIS: ICD-10-CM

## 2025-01-20 PROCEDURE — 70486 CT MAXILLOFACIAL W/O DYE: CPT | Mod: 26

## 2025-01-20 PROCEDURE — 70486 CT MAXILLOFACIAL W/O DYE: CPT

## 2025-01-29 ENCOUNTER — APPOINTMENT (OUTPATIENT)
Dept: OTOLARYNGOLOGY | Facility: CLINIC | Age: 50
End: 2025-01-29
Payer: COMMERCIAL

## 2025-01-29 DIAGNOSIS — R04.0 EPISTAXIS: ICD-10-CM

## 2025-01-29 DIAGNOSIS — J32.0 CHRONIC MAXILLARY SINUSITIS: ICD-10-CM

## 2025-01-29 DIAGNOSIS — J30.9 ALLERGIC RHINITIS, UNSPECIFIED: ICD-10-CM

## 2025-01-29 DIAGNOSIS — S02.2XXA FRACTURE OF NASAL BONES, INITIAL ENCOUNTER FOR CLOSED FRACTURE: ICD-10-CM

## 2025-01-29 PROCEDURE — 99214 OFFICE O/P EST MOD 30 MIN: CPT | Mod: 25

## 2025-01-29 PROCEDURE — 31231 NASAL ENDOSCOPY DX: CPT

## 2025-01-29 RX ORDER — AZELASTINE HYDROCHLORIDE 137 UG/1
137 SPRAY, METERED NASAL
Qty: 1 | Refills: 5 | Status: ACTIVE | COMMUNITY
Start: 2025-01-29 | End: 1900-01-01

## 2025-01-29 RX ORDER — AZELASTINE HYDROCHLORIDE 137 UG/1
0.1 SPRAY, METERED NASAL
Qty: 1 | Refills: 3 | Status: ACTIVE | COMMUNITY
Start: 2025-01-29 | End: 1900-01-01

## 2025-02-19 ENCOUNTER — RX CHANGE (OUTPATIENT)
Age: 50
End: 2025-02-19

## 2025-02-19 RX ORDER — AZELASTINE HYDROCHLORIDE 137 UG/1
137 SPRAY, METERED NASAL
Qty: 3 | Refills: 3 | Status: ACTIVE | COMMUNITY
Start: 1900-01-01 | End: 1900-01-01

## 2025-02-27 ENCOUNTER — APPOINTMENT (OUTPATIENT)
Dept: PEDIATRIC ALLERGY IMMUNOLOGY | Facility: CLINIC | Age: 50
End: 2025-02-27
Payer: COMMERCIAL

## 2025-02-27 VITALS
BODY MASS INDEX: 38.09 KG/M2 | WEIGHT: 215 LBS | SYSTOLIC BLOOD PRESSURE: 120 MMHG | HEIGHT: 63 IN | DIASTOLIC BLOOD PRESSURE: 80 MMHG

## 2025-02-27 VITALS — BODY MASS INDEX: 38.09 KG/M2 | WEIGHT: 215 LBS | HEIGHT: 63 IN

## 2025-02-27 DIAGNOSIS — J30.89 OTHER ALLERGIC RHINITIS: ICD-10-CM

## 2025-02-27 DIAGNOSIS — L25.9 UNSPECIFIED CONTACT DERMATITIS, UNSPECIFIED CAUSE: ICD-10-CM

## 2025-02-27 DIAGNOSIS — L27.0 GENERALIZED SKIN ERUPTION DUE TO DRUGS AND MEDICAMENTS TAKEN INTERNALLY: ICD-10-CM

## 2025-02-27 DIAGNOSIS — H10.13 ACUTE ATOPIC CONJUNCTIVITIS, BILATERAL: ICD-10-CM

## 2025-02-27 DIAGNOSIS — T78.02XA ANAPHYLACTIC REACTION DUE TO SHELLFISH (CRUSTACEANS), INITIAL ENCOUNTER: ICD-10-CM

## 2025-02-27 PROCEDURE — 99204 OFFICE O/P NEW MOD 45 MIN: CPT

## 2025-02-27 RX ORDER — ALBUTEROL 90 MCG
90 AEROSOL (GRAM) INHALATION
Refills: 0 | Status: ACTIVE | COMMUNITY